# Patient Record
Sex: FEMALE | Race: WHITE | Employment: PART TIME | URBAN - NONMETROPOLITAN AREA
[De-identification: names, ages, dates, MRNs, and addresses within clinical notes are randomized per-mention and may not be internally consistent; named-entity substitution may affect disease eponyms.]

---

## 2020-02-21 ENCOUNTER — OFFICE VISIT (OUTPATIENT)
Dept: FAMILY MEDICINE CLINIC | Age: 25
End: 2020-02-21
Payer: COMMERCIAL

## 2020-02-21 VITALS
SYSTOLIC BLOOD PRESSURE: 148 MMHG | TEMPERATURE: 99 F | HEART RATE: 107 BPM | OXYGEN SATURATION: 98 % | WEIGHT: 234 LBS | DIASTOLIC BLOOD PRESSURE: 98 MMHG | HEIGHT: 61 IN | BODY MASS INDEX: 44.18 KG/M2

## 2020-02-21 PROCEDURE — G8427 DOCREV CUR MEDS BY ELIG CLIN: HCPCS | Performed by: NURSE PRACTITIONER

## 2020-02-21 PROCEDURE — 1036F TOBACCO NON-USER: CPT | Performed by: NURSE PRACTITIONER

## 2020-02-21 PROCEDURE — 99213 OFFICE O/P EST LOW 20 MIN: CPT | Performed by: NURSE PRACTITIONER

## 2020-02-21 PROCEDURE — G8417 CALC BMI ABV UP PARAM F/U: HCPCS | Performed by: NURSE PRACTITIONER

## 2020-02-21 PROCEDURE — G8484 FLU IMMUNIZE NO ADMIN: HCPCS | Performed by: NURSE PRACTITIONER

## 2020-02-21 RX ORDER — CETIRIZINE HYDROCHLORIDE 10 MG/1
10 TABLET ORAL DAILY
Qty: 30 TABLET | Refills: 1 | Status: SHIPPED | OUTPATIENT
Start: 2020-02-21 | End: 2020-03-22

## 2020-02-21 RX ORDER — SULFAMETHOXAZOLE AND TRIMETHOPRIM 800; 160 MG/1; MG/1
1 TABLET ORAL 2 TIMES DAILY
Qty: 14 TABLET | Refills: 0 | Status: SHIPPED | OUTPATIENT
Start: 2020-02-21 | End: 2020-02-28

## 2020-02-21 RX ORDER — FLUTICASONE PROPIONATE 50 MCG
SPRAY, SUSPENSION (ML) NASAL
COMMUNITY
Start: 2020-01-13

## 2020-02-21 RX ORDER — CYCLOBENZAPRINE HCL 10 MG
10 TABLET ORAL NIGHTLY PRN
Qty: 30 TABLET | Refills: 0 | Status: SHIPPED | OUTPATIENT
Start: 2020-02-21 | End: 2020-03-22

## 2020-02-21 ASSESSMENT — PATIENT HEALTH QUESTIONNAIRE - PHQ9
SUM OF ALL RESPONSES TO PHQ QUESTIONS 1-9: 0
SUM OF ALL RESPONSES TO PHQ QUESTIONS 1-9: 0
2. FEELING DOWN, DEPRESSED OR HOPELESS: 0
SUM OF ALL RESPONSES TO PHQ9 QUESTIONS 1 & 2: 0
1. LITTLE INTEREST OR PLEASURE IN DOING THINGS: 0

## 2020-02-21 ASSESSMENT — ENCOUNTER SYMPTOMS
RHINORRHEA: 1
COUGH: 1
COLOR CHANGE: 0
SINUS PRESSURE: 1
SORE THROAT: 1
SINUS PAIN: 0

## 2020-02-21 NOTE — PROGRESS NOTES
Subjective  Elia Dakins, 27 y.o. female presents today with:  Chief Complaint   Patient presents with    Other     Pt has a bump on the back of her left leg. Pt states its painful, and warm to the touch. x3 days     Jaw Pain     Pt is having pain in right jaw. x1 week Pt has taken ibuprofen. HPI   Patient is here for a bump on her left lower leg. Noticed it 3 days ago; it was itching. Is now painful. Painful to touch, painful if anything brushes against it. Skin is now warm to the touch. It has grown from a small dot that itched to a large red bump. Has not taken anything for it. Patient is having right sided jaw pain for the last week. It is generalized pain on the right side of her mouth. Patient grinds teeth. Ibuprofen helps alleviate pain mildly. She is able to pop her jaw, is not able to anymore. Has had cold symptoms (congestion, runny nose, night time and morning cough) for months. When asked, symptoms have stayed the same. Review of Systems   Constitutional: Negative for chills, diaphoresis and fever. HENT: Positive for congestion, rhinorrhea, sinus pressure and sore throat (intermittently). Negative for sinus pain. Right sided jaw pain   Respiratory: Positive for cough (in the morning). Skin: Positive for rash. Negative for color change, pallor and wound. Objective    Vitals:    02/21/20 1802 02/21/20 1808   BP: (!) 144/96 (!) 148/98   Site: Left Upper Arm Left Upper Arm   Position: Sitting Sitting   Cuff Size: Large Adult Large Adult   Pulse: 107    Temp: 99 °F (37.2 °C)    SpO2: 98%    Weight: 234 lb (106.1 kg)    Height: 5' 1\" (1.549 m)        Physical Exam  Vitals signs reviewed. Constitutional:       General: She is not in acute distress. Appearance: She is well-developed. She is not diaphoretic. HENT:      Head: Normocephalic and atraumatic. Jaw: Tenderness and pain on movement present.       Comments: Popping with movement     Right Ear: